# Patient Record
Sex: MALE | Race: WHITE | Employment: STUDENT | URBAN - METROPOLITAN AREA
[De-identification: names, ages, dates, MRNs, and addresses within clinical notes are randomized per-mention and may not be internally consistent; named-entity substitution may affect disease eponyms.]

---
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
## 2022-11-29 ENCOUNTER — HOSPITAL ENCOUNTER (EMERGENCY)
Age: 22
Discharge: HOME | End: 2022-11-29
Attending: STUDENT IN AN ORGANIZED HEALTH CARE EDUCATION/TRAINING PROGRAM
Payer: COMMERCIAL

## 2022-11-29 VITALS
TEMPERATURE: 98.1 F | DIASTOLIC BLOOD PRESSURE: 118 MMHG | RESPIRATION RATE: 16 BRPM | HEART RATE: 88 BPM | SYSTOLIC BLOOD PRESSURE: 143 MMHG | OXYGEN SATURATION: 98 %

## 2022-11-29 DIAGNOSIS — R68.89 FLU-LIKE SYMPTOMS: Primary | ICD-10-CM

## 2022-11-29 DIAGNOSIS — G44.89 OTHER HEADACHE SYNDROME: ICD-10-CM

## 2022-11-29 LAB
COVID-19 RAPID TEST, COVR: NOT DETECTED
FLUAV AG NPH QL IA: NEGATIVE
FLUBV AG NOSE QL IA: NEGATIVE
SOURCE, COVRS: NORMAL

## 2022-11-29 PROCEDURE — 36415 COLL VENOUS BLD VENIPUNCTURE: CPT

## 2022-11-29 PROCEDURE — 87635 SARS-COV-2 COVID-19 AMP PRB: CPT

## 2022-11-29 PROCEDURE — 74011250636 HC RX REV CODE- 250/636: Performed by: NURSE PRACTITIONER

## 2022-11-29 PROCEDURE — 87804 INFLUENZA ASSAY W/OPTIC: CPT

## 2022-11-29 PROCEDURE — 99284 EMERGENCY DEPT VISIT MOD MDM: CPT

## 2022-11-29 PROCEDURE — 96375 TX/PRO/DX INJ NEW DRUG ADDON: CPT

## 2022-11-29 PROCEDURE — 96374 THER/PROPH/DIAG INJ IV PUSH: CPT

## 2022-11-29 RX ORDER — KETOROLAC TROMETHAMINE 30 MG/ML
30 INJECTION, SOLUTION INTRAMUSCULAR; INTRAVENOUS
Status: COMPLETED | OUTPATIENT
Start: 2022-11-29 | End: 2022-11-29

## 2022-11-29 RX ORDER — BUTALBITAL, ACETAMINOPHEN AND CAFFEINE 300; 40; 50 MG/1; MG/1; MG/1
1 CAPSULE ORAL
Qty: 15 CAPSULE | Refills: 0 | Status: SHIPPED | OUTPATIENT
Start: 2022-11-29

## 2022-11-29 RX ORDER — PROCHLORPERAZINE MALEATE 10 MG
10 TABLET ORAL
Qty: 12 TABLET | Refills: 0 | Status: SHIPPED | OUTPATIENT
Start: 2022-11-29 | End: 2022-12-06

## 2022-11-29 RX ORDER — PROCHLORPERAZINE EDISYLATE 5 MG/ML
10 INJECTION INTRAMUSCULAR; INTRAVENOUS
Status: COMPLETED | OUTPATIENT
Start: 2022-11-29 | End: 2022-11-29

## 2022-11-29 RX ORDER — DIPHENHYDRAMINE HYDROCHLORIDE 50 MG/ML
25 INJECTION, SOLUTION INTRAMUSCULAR; INTRAVENOUS
Status: COMPLETED | OUTPATIENT
Start: 2022-11-29 | End: 2022-11-29

## 2022-11-29 RX ORDER — DEXAMETHASONE SODIUM PHOSPHATE 10 MG/ML
10 INJECTION INTRAMUSCULAR; INTRAVENOUS ONCE
Status: COMPLETED | OUTPATIENT
Start: 2022-11-29 | End: 2022-11-29

## 2022-11-29 RX ADMIN — PROCHLORPERAZINE EDISYLATE 10 MG: 5 INJECTION INTRAMUSCULAR; INTRAVENOUS at 02:58

## 2022-11-29 RX ADMIN — SODIUM CHLORIDE, POTASSIUM CHLORIDE, SODIUM LACTATE AND CALCIUM CHLORIDE 1000 ML: 600; 310; 30; 20 INJECTION, SOLUTION INTRAVENOUS at 02:51

## 2022-11-29 RX ADMIN — DEXAMETHASONE SODIUM PHOSPHATE 10 MG: 10 INJECTION INTRAMUSCULAR; INTRAVENOUS at 02:53

## 2022-11-29 RX ADMIN — KETOROLAC TROMETHAMINE 30 MG: 30 INJECTION, SOLUTION INTRAMUSCULAR; INTRAVENOUS at 02:54

## 2022-11-29 RX ADMIN — DIPHENHYDRAMINE HYDROCHLORIDE 25 MG: 50 INJECTION, SOLUTION INTRAMUSCULAR; INTRAVENOUS at 02:56

## 2022-11-29 NOTE — ED TRIAGE NOTES
Triage: Pt arrives ambulatory from home with CC of chills, fever, headache, and vomiting. 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 He also reports a monkey pox contact. Pt took tylenol and ibuprofen 30 minutes ago.

## 2022-11-29 NOTE — ED PROVIDER NOTES
57-year-old male with past medical history of ADD and reactive depression presents the ER today for evaluation of fever/chills, congestion, cough, sinus pressure, headache, fatigue, and dizziness x2 days. Patient states that he works in [employer], and is exposed to viral infections frequently. He states that he has been managing his symptoms with Tylenol and NSAIDs, but continues to have recurrent fever when these medications wear off as well as a severe frontal headache described as \"throbbing\" in nature. He states that he also started develop some nausea and vomiting over the last 24 hours and states that he continues to feel weak because he is holding down only small amounts of fluids.        Past Medical History:   Diagnosis Date    ADD (attention deficit disorder with hyperactivity)     Left ankle pain 7/7/2015    Left ankle sprain 7/7/2015    Nodular dermatitis 7/21/2015    Pelvic swelling 7/17/2014    Reactive depression 8/2/2016    Skin lesion 7/7/2015    Strep throat 12/22/2015    Undescended right testes 7/17/2014       Past Surgical History:   Procedure Laterality Date    HX UROLOGICAL Right 2015    orchiopexy    ORCHIOPEXY,ABD APPRCH,ABD TESTIS      TEST           Family History:   Problem Relation Age of Onset    Cancer Maternal Grandmother     Diabetes Maternal Grandmother     Hypertension Maternal Grandmother        Social History     Socioeconomic History    Marital status: SINGLE     Spouse name: Not on file    Number of children: Not on file    Years of education: Not on file    Highest education level: Not on file   Occupational History    Not on file   Tobacco Use    Smoking status: Never    Smokeless tobacco: Never   Substance and Sexual Activity    Alcohol use: No    Drug use: No    Sexual activity: Never   Other Topics Concern    Not on file   Social History Narrative    Not on file     Social Determinants of Health     Financial Resource Strain: Not on file Food Insecurity: Not on file   Transportation Needs: Not on file   Physical Activity: Not on file   Stress: Not on file   Social Connections: Not on file   Intimate Partner Violence: Not on file   Housing Stability: Not on file         ALLERGIES: Patient has no known allergies. Review of Systems   Constitutional:  Positive for activity change, appetite change, chills, fatigue and fever. HENT:  Positive for congestion. Respiratory:  Positive for cough. Gastrointestinal:  Positive for nausea and vomiting. Negative for abdominal pain. Neurological:  Positive for dizziness, light-headedness and headaches. All other systems reviewed and are negative. Vitals:    11/29/22 0130   BP: 133/83   Pulse: (!) 105   Resp: 16   Temp: 100.2 °F (37.9 °C)   SpO2: 97%            Physical Exam  Vitals and nursing note reviewed. Constitutional:       Comments: Patient is a little bit clammy and appears uncomfortable   HENT:      Head: Normocephalic and atraumatic. Nose: Nose normal.      Mouth/Throat:      Mouth: Mucous membranes are dry. Pharynx: Oropharynx is clear. Eyes:      Extraocular Movements: Extraocular movements intact. Cardiovascular:      Rate and Rhythm: Regular rhythm. Tachycardia present. Pulses: Normal pulses. Heart sounds: Normal heart sounds. No murmur heard. No friction rub. Pulmonary:      Effort: Pulmonary effort is normal. No tachypnea. Breath sounds: Normal breath sounds and air entry. Abdominal:      General: Bowel sounds are normal. There is no distension. Palpations: Abdomen is soft. Tenderness: There is no abdominal tenderness. Musculoskeletal:         General: Normal range of motion. Cervical back: Normal range of motion and neck supple. No rigidity. No pain with movement or muscular tenderness. Skin:     General: Skin is warm and dry. Neurological:      Mental Status: He is alert and oriented to person, place, and time.  Mental status is at baseline. Psychiatric:         Mood and Affect: Mood normal.         Behavior: Behavior normal.        MDM     VITAL SIGNS:  Patient Vitals for the past 4 hrs:   Temp Pulse Resp BP SpO2   11/29/22 0130 100.2 °F (37.9 °C) (!) 105 16 133/83 97 %         LABS:  Recent Results (from the past 6 hour(s))   INFLUENZA A+B VIRAL AGS    Collection Time: 11/29/22  1:38 AM   Result Value Ref Range    Influenza A Antigen Negative NEG      Influenza B Antigen Negative NEG     COVID-19 RAPID TEST    Collection Time: 11/29/22  1:48 AM   Result Value Ref Range    Specimen source Nasopharyngeal      COVID-19 rapid test Not detected NOTD          IMAGING:  No orders to display         Medications During Visit:  Medications   lactated ringers bolus infusion 1,000 mL (1,000 mL IntraVENous New Bag 11/29/22 0251)   ketorolac (TORADOL) injection 30 mg (30 mg IntraVENous Given 11/29/22 0254)   prochlorperazine (COMPAZINE) injection 10 mg (10 mg IntraVENous Given 11/29/22 0258)   diphenhydrAMINE (BENADRYL) injection 25 mg (25 mg IntraVENous Given 11/29/22 0256)   dexamethasone (PF) (DECADRON) 10 mg/mL injection 10 mg (10 mg IntraVENous Given 11/29/22 0253)         DECISION MAKING:  Ulises Nieto is a 23 y.o. male who comes in as above. Negative influenza and COVID-19. Patient has symptoms that are consistent with viral infection. No nuchal rigidity or meningeal signs. Patient does appear little bit dehydrated and was offered a migraine cocktail for his ongoing headache. He is feeling better after the medications, he will be discharged home with supportive care measures and instructions to plenty of rest, increase fluid intake, and use Fioricet and Compazine as needed for ongoing symptoms      IMPRESSION:  1. Flu-like symptoms    2.  Other headache syndrome        DISPOSITION:  Discharged      Current Discharge Medication List        START taking these medications    Details   prochlorperazine (Compazine) 10 mg tablet Take 1 Tablet by mouth every eight (8) hours as needed for Nausea for up to 7 days. Qty: 12 Tablet, Refills: 0  Start date: 11/29/2022, End date: 12/6/2022      butalbital-acetaminophen-caff (Fioricet) -40 mg per capsule Take 1 Capsule by mouth every six (6) hours as needed for Headache. Qty: 15 Capsule, Refills: 0  Start date: 11/29/2022              Follow-up Information       Follow up With Specialties Details Why Contact K[address], [facility] Emergency [facility]  If symptoms worsen [address] [place] [address]  [phone]              The patient is asked to follow-up with their primary care provider in the next several days. They are to call tomorrow for an appointment. The patient is asked to return promptly for any increased concerns or worsening of symptoms. They can return to this emergency department or any other emergency department.       Procedures

## 2023-05-18 RX ORDER — IBUPROFEN 600 MG/1
600 TABLET ORAL EVERY 8 HOURS PRN
COMMUNITY
Start: 2017-05-01

## 2023-05-18 RX ORDER — HYDROCODONE BITARTRATE AND ACETAMINOPHEN 5; 325 MG/1; MG/1
1 TABLET ORAL EVERY 4 HOURS PRN
COMMUNITY
Start: 2017-05-01

## 2023-05-18 RX ORDER — BUTALBITAL, ACETAMINOPHEN AND CAFFEINE 300; 40; 50 MG/1; MG/1; MG/1
1 CAPSULE ORAL EVERY 6 HOURS PRN
COMMUNITY
Start: 2022-11-29